# Patient Record
Sex: MALE | NOT HISPANIC OR LATINO | URBAN - METROPOLITAN AREA
[De-identification: names, ages, dates, MRNs, and addresses within clinical notes are randomized per-mention and may not be internally consistent; named-entity substitution may affect disease eponyms.]

---

## 2017-04-26 ENCOUNTER — EMERGENCY (EMERGENCY)
Facility: HOSPITAL | Age: 22
LOS: 0 days | Discharge: HOME | End: 2017-04-26
Attending: EMERGENCY MEDICINE | Admitting: EMERGENCY MEDICINE
Payer: MEDICAID

## 2017-04-26 VITALS
HEART RATE: 78 BPM | RESPIRATION RATE: 18 BRPM | TEMPERATURE: 98 F | OXYGEN SATURATION: 99 % | DIASTOLIC BLOOD PRESSURE: 62 MMHG | SYSTOLIC BLOOD PRESSURE: 107 MMHG

## 2017-04-26 VITALS — WEIGHT: 149.91 LBS | HEIGHT: 64 IN
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
## 2017-04-26 DIAGNOSIS — S61.210A LACERATION WITHOUT FOREIGN BODY OF RIGHT INDEX FINGER WITHOUT DAMAGE TO NAIL, INITIAL ENCOUNTER: ICD-10-CM
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
## 2017-04-26 PROCEDURE — 12001 RPR S/N/AX/GEN/TRNK 2.5CM/<: CPT

## 2017-04-26 PROCEDURE — 99283 EMERGENCY DEPT VISIT LOW MDM: CPT | Mod: 25

## 2017-04-26 NOTE — ED PROVIDER NOTE - OBJECTIVE STATEMENT
23 y/o male with no PMHx presents to the ED c/o lac to right 1st finger while cutting [place] PTA. Pt is right handed. Tetanus UTD. No  PMD.

## 2017-04-26 NOTE — ED PROVIDER NOTE - DETAILS:
I, Estella Chavira, performed the initial face to face bedside interview with this patient regarding history of present illness, review of symptoms and relevant past medical, social and family history.  I completed an independent physical examination.  I was the initial provider who evaluated this patient. The history, relevant review of systems, past medical and surgical history, medical decision making, and physical examination was documented by the scribe in my presence and I attest to the accuracy of the documentation.

## 2017-04-26 NOTE — ED ADULT NURSE NOTE - OBJECTIVE STATEMENT
Patient arrived to ED s/p lac to right hand, index finger. Bleeding controlled with pressure. Patient reports last tetanus shot around 4 years ago. Positive peripheral pulse, sensation intact.

## 2017-04-26 NOTE — ED PROVIDER NOTE - SKIN, MLM
Skin normal color for race, warm, dry. No evidence of rash. Right index finger with a 1.5cm irregular lac over the dorsum of the DIP. FROM. Sensation intact.

## 2017-04-26 NOTE — ED PROVIDER NOTE - NS ED MD SCRIBE ATTENDING SCRIBE SECTIONS
REVIEW OF SYSTEMS/RESULTS/VITAL SIGNS( Pullset)/PROGRESS NOTE/PHYSICAL EXAM/PAST MEDICAL/SURGICAL/SOCIAL HISTORY/HISTORY OF PRESENT ILLNESS/DISPOSITION

## 2017-04-26 NOTE — ED PROVIDER NOTE - PROGRESS NOTE DETAILS
absorbable #5 chromic, 5 sutures applied, simple, interrupted, after area was prepped with betadine, anesthetized with 1% lidocaine, irrigated profusely with ns, wrapped in dsd with splint

## 2018-05-02 ENCOUNTER — EMERGENCY (EMERGENCY)
Facility: HOSPITAL | Age: 23
LOS: 0 days | Discharge: HOME | End: 2018-05-03
Attending: EMERGENCY MEDICINE | Admitting: EMERGENCY MEDICINE
Payer: SELF-PAY

## 2018-05-02 VITALS
TEMPERATURE: 98 F | HEART RATE: 58 BPM | SYSTOLIC BLOOD PRESSURE: 139 MMHG | WEIGHT: 175.93 LBS | OXYGEN SATURATION: 99 % | RESPIRATION RATE: 18 BRPM | DIASTOLIC BLOOD PRESSURE: 78 MMHG

## 2018-05-02 PROCEDURE — 12002 RPR S/N/AX/GEN/TRNK2.6-7.5CM: CPT

## 2018-05-02 PROCEDURE — 99283 EMERGENCY DEPT VISIT LOW MDM: CPT | Mod: 25

## 2018-05-02 PROCEDURE — 99053 MED SERV 10PM-8AM 24 HR FAC: CPT

## 2018-05-02 NOTE — ED ADULT TRIAGE NOTE - CHIEF COMPLAINT QUOTE
Pt c/o laceration to right leg, cut on metal plate. Pt c/o laceration to right leg, cut on metal plate. unable to assess wound as pt wrapped it. pt has heavy active bleeding, seen through bandage pt denies SOb dizziness or weakness

## 2018-05-03 VITALS
HEART RATE: 61 BPM | DIASTOLIC BLOOD PRESSURE: 85 MMHG | SYSTOLIC BLOOD PRESSURE: 131 MMHG | RESPIRATION RATE: 16 BRPM | TEMPERATURE: 98 F | OXYGEN SATURATION: 100 %

## 2018-05-03 PROCEDURE — 73590 X-RAY EXAM OF LOWER LEG: CPT | Mod: 26,RT

## 2018-05-03 RX ORDER — CEFAZOLIN SODIUM 1 G
1000 VIAL (EA) INJECTION ONCE
Qty: 0 | Refills: 0 | Status: COMPLETED | OUTPATIENT
Start: 2018-05-03 | End: 2018-05-03

## 2018-05-03 RX ADMIN — Medication 100 MILLIGRAM(S): at 00:35

## 2018-05-03 NOTE — ED POST DISCHARGE NOTE - OTHER COMMUNICATION
Called and unable to leave VM on pts number or contact emergency contact number. -Arturo Patterson PA-C

## 2018-05-03 NOTE — ED ADULT NURSE REASSESSMENT NOTE - NS ED NURSE REASSESS COMMENT FT1
completed sutures. Pt tolerated well, bleeding controlled and bandage/dressing applied. Pt brought crutches and instructed how to use them. DC pending

## 2018-05-03 NOTE — ED PROVIDER NOTE - OBJECTIVE STATEMENT
22 yo male with no pmh c/o laceration to right leg.  Pt was at work and ran into a metal cart cutting the right lower leg around 1130p.  +bleeding.  tetanus utd.  no other complaints. 22 yo male with no pmh c/o laceration to right leg.  Pt was at work and ran to his car, cut his leg on the license plate of his car, cutting the right lower leg around 1130p.  +bleeding.  tetanus utd.  no other complaints.

## 2018-05-03 NOTE — ED ADULT NURSE NOTE - OBJECTIVE STATEMENT
Pt presents to ER c/o laceration to anterior RLE below knee. Pt reports hitting leg into metal cart at work at 2330 on 5/2/18. Laceration is approximately 7cm x 4cm; 2cm deep, actively bleeding. Pt reports tingling in right foot. +2 pulses in b/l lower extremity, good ROM. Pt wrapped wound with towel and plastic bag at work. Denies blood thinners. AO x 3 oriented to baseline normal breathing pattern with no difficulty.

## 2018-05-03 NOTE — ED ADULT NURSE NOTE - CHIEF COMPLAINT QUOTE
Pt c/o laceration to right leg, cut on metal plate. unable to assess wound as pt wrapped it. pt has heavy active bleeding, seen through bandage pt denies SOb dizziness or weakness

## 2018-05-03 NOTE — ED POST DISCHARGE NOTE - DETAILS
Left message on voice mail to return call to ED> Sara HALE Patient returned call to ED reported X-Ray reading of displaced avulsion fracture on femur and instructed to F/U with Orthopedic Dr. Baxter's  name and number given to patient. Sara HALE
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
## 2018-05-04 DIAGNOSIS — S81.811A LACERATION WITHOUT FOREIGN BODY, RIGHT LOWER LEG, INITIAL ENCOUNTER: ICD-10-CM
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
## 2018-05-08 ENCOUNTER — EMERGENCY (EMERGENCY)
Facility: HOSPITAL | Age: 23
LOS: 0 days | Discharge: HOME | End: 2018-05-08
Attending: EMERGENCY MEDICINE | Admitting: EMERGENCY MEDICINE
Payer: SELF-PAY

## 2018-05-08 VITALS
HEART RATE: 60 BPM | OXYGEN SATURATION: 100 % | TEMPERATURE: 98 F | SYSTOLIC BLOOD PRESSURE: 150 MMHG | DIASTOLIC BLOOD PRESSURE: 81 MMHG | RESPIRATION RATE: 16 BRPM

## 2018-05-08 VITALS — WEIGHT: 160.06 LBS | HEIGHT: 65 IN
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
## 2018-05-08 DIAGNOSIS — S80.01XD CONTUSION OF RIGHT KNEE, SUBSEQUENT ENCOUNTER: ICD-10-CM

## 2018-05-08 PROCEDURE — 73700 CT LOWER EXTREMITY W/O DYE: CPT | Mod: 26,RT

## 2018-05-08 PROCEDURE — 76377 3D RENDER W/INTRP POSTPROCES: CPT | Mod: 26

## 2018-05-08 PROCEDURE — 99284 EMERGENCY DEPT VISIT MOD MDM: CPT

## 2018-05-08 PROCEDURE — 93971 EXTREMITY STUDY: CPT | Mod: 26,RT

## 2018-05-08 NOTE — ED ADULT NURSE NOTE - OBJECTIVE STATEMENT
Pt states he ran into the license plate of his car. Pt was seen that evening and given sutures.  Appears to be approximately 8 sutures. Pt presents today c/o increased pain and swelling.

## 2018-05-08 NOTE — ED ADULT TRIAGE NOTE - CHIEF COMPLAINT QUOTE
Patient presents stating he had a call back from ER to visit with outpatient orthopedic team due to xray results of femur. Patient states he doesn't have insurance to cover outpatient visit so he came to ER

## 2018-05-08 NOTE — ED STATDOCS - PROGRESS NOTE DETAILS
DANETTE Chamorro:   Patient has been seen, evaluated and orders have been written by the attending in intake. Patient is stable.  I will follow up the results of orders written and I will continue to evaluate/observe the patient. Neg DVT.  CT results pending.  Senait Chamorro PA-C Called Ortho as pt. was tender at medial femoral condyle on Re-eval.  CT with age indeterminate ? Avulsion Fx.  Ortho reported it is actually Wendy Steata finding on CT/X-ray likely from old MCL injury that has calcified.   no acute Fx.  Will apply Knee Immobilizer, elevate   Pt. will cont. PO Augmentin and f./u as directed for suture removal.  Senait Chamorro PA-C

## 2018-05-08 NOTE — ED STATDOCS - OBJECTIVE STATEMENT
22 y/o M presents to ED for call back from ER to visit with outpatient orthopedic team due to xray results of femur. Pt reports 5 days ago he was running in front of car and cut right lower leg on license plate. + pain upon bearing weight or bending right knee. X-ray showed suggestion of small displaced avulsion fragment at medial femoral condyle, pt denies pain to area. Reports he washes wound BID. No fever, SOB, or any other acute complaints.

## 2018-05-08 NOTE — ED STATDOCS - SKIN, MLM
R lower extremity wound clean dry and intact. edema and ttp below R knee w/ decreased ROM secondary to pain. No TTP at medial femoral condyle.

## 2022-08-18 NOTE — ED ADULT NURSE NOTE - PAIN: PRESENCE, MLM
complains of pain/discomfort Benzoyl Peroxide Pregnancy And Lactation Text: This medication is Pregnancy Category C. It is unknown if benzoyl peroxide is excreted in breast milk.

## 2023-01-05 NOTE — ED PROVIDER NOTE - PMH
No pertinent past medical history Picato Counseling:  I discussed with the patient the risks of Picato including but not limited to erythema, scaling, itching, weeping, crusting, and pain.